# Patient Record
Sex: MALE | Race: WHITE | NOT HISPANIC OR LATINO | Employment: FULL TIME | RURAL
[De-identification: names, ages, dates, MRNs, and addresses within clinical notes are randomized per-mention and may not be internally consistent; named-entity substitution may affect disease eponyms.]

---

## 2021-08-24 ENCOUNTER — OFFICE VISIT (OUTPATIENT)
Dept: FAMILY MEDICINE | Facility: CLINIC | Age: 46
End: 2021-08-24
Payer: COMMERCIAL

## 2021-08-24 VITALS
TEMPERATURE: 98 F | HEIGHT: 71 IN | WEIGHT: 172 LBS | HEART RATE: 75 BPM | OXYGEN SATURATION: 95 % | BODY MASS INDEX: 24.08 KG/M2

## 2021-08-24 DIAGNOSIS — Z20.822 CONTACT WITH AND (SUSPECTED) EXPOSURE TO COVID-19: Primary | ICD-10-CM

## 2021-08-24 LAB
CTP QC/QA: YES
SARS-COV-2 AG RESP QL IA.RAPID: NEGATIVE

## 2021-08-24 PROCEDURE — 99202 PR OFFICE/OUTPT VISIT, NEW, LEVL II, 15-29 MIN: ICD-10-PCS | Mod: ,,, | Performed by: NURSE PRACTITIONER

## 2021-08-24 PROCEDURE — 3008F PR BODY MASS INDEX (BMI) DOCUMENTED: ICD-10-PCS | Mod: ,,, | Performed by: NURSE PRACTITIONER

## 2021-08-24 PROCEDURE — 87426 SARS CORONAVIRUS 2 ANTIGEN POCT: ICD-10-PCS | Mod: QW,,, | Performed by: NURSE PRACTITIONER

## 2021-08-24 PROCEDURE — 1160F RVW MEDS BY RX/DR IN RCRD: CPT | Mod: ,,, | Performed by: NURSE PRACTITIONER

## 2021-08-24 PROCEDURE — 1160F PR REVIEW ALL MEDS BY PRESCRIBER/CLIN PHARMACIST DOCUMENTED: ICD-10-PCS | Mod: ,,, | Performed by: NURSE PRACTITIONER

## 2021-08-24 PROCEDURE — 87426 SARSCOV CORONAVIRUS AG IA: CPT | Mod: QW,,, | Performed by: NURSE PRACTITIONER

## 2021-08-24 PROCEDURE — 99202 OFFICE O/P NEW SF 15 MIN: CPT | Mod: ,,, | Performed by: NURSE PRACTITIONER

## 2021-08-24 PROCEDURE — 1159F PR MEDICATION LIST DOCUMENTED IN MEDICAL RECORD: ICD-10-PCS | Mod: ,,, | Performed by: NURSE PRACTITIONER

## 2021-08-24 PROCEDURE — 3008F BODY MASS INDEX DOCD: CPT | Mod: ,,, | Performed by: NURSE PRACTITIONER

## 2021-08-24 PROCEDURE — 1159F MED LIST DOCD IN RCRD: CPT | Mod: ,,, | Performed by: NURSE PRACTITIONER

## 2021-08-24 RX ORDER — ONDANSETRON HYDROCHLORIDE 8 MG/1
TABLET, FILM COATED ORAL
COMMUNITY
Start: 2021-07-20

## 2021-08-24 RX ORDER — BUPROPION HYDROCHLORIDE 150 MG/1
150 TABLET ORAL EVERY MORNING
COMMUNITY
Start: 2021-08-17

## 2021-08-24 RX ORDER — TAMSULOSIN HYDROCHLORIDE 0.4 MG/1
1 CAPSULE ORAL DAILY
COMMUNITY
Start: 2021-07-21

## 2021-08-24 RX ORDER — HYDROCODONE BITARTRATE AND ACETAMINOPHEN 10; 325 MG/1; MG/1
TABLET ORAL
COMMUNITY
Start: 2021-07-20

## 2023-09-06 ENCOUNTER — OFFICE VISIT (OUTPATIENT)
Dept: FAMILY MEDICINE | Facility: CLINIC | Age: 48
End: 2023-09-06
Payer: COMMERCIAL

## 2023-09-06 VITALS
WEIGHT: 180 LBS | SYSTOLIC BLOOD PRESSURE: 162 MMHG | DIASTOLIC BLOOD PRESSURE: 98 MMHG | HEIGHT: 70 IN | OXYGEN SATURATION: 96 % | TEMPERATURE: 98 F | BODY MASS INDEX: 25.77 KG/M2 | RESPIRATION RATE: 18 BRPM | HEART RATE: 81 BPM

## 2023-09-06 DIAGNOSIS — R03.0 ELEVATED BLOOD PRESSURE READING IN OFFICE WITHOUT DIAGNOSIS OF HYPERTENSION: ICD-10-CM

## 2023-09-06 DIAGNOSIS — J06.9 UPPER RESPIRATORY TRACT INFECTION, UNSPECIFIED TYPE: Primary | ICD-10-CM

## 2023-09-06 DIAGNOSIS — Z91.89 AT INCREASED RISK OF EXPOSURE TO COVID-19 VIRUS: ICD-10-CM

## 2023-09-06 LAB
CTP QC/QA: YES
FLUAV AG NPH QL: NEGATIVE
FLUBV AG NPH QL: NEGATIVE
SARS-COV-2 AG RESP QL IA.RAPID: NEGATIVE

## 2023-09-06 PROCEDURE — 3080F DIAST BP >= 90 MM HG: CPT | Mod: ,,, | Performed by: STUDENT IN AN ORGANIZED HEALTH CARE EDUCATION/TRAINING PROGRAM

## 2023-09-06 PROCEDURE — 3080F PR MOST RECENT DIASTOLIC BLOOD PRESSURE >= 90 MM HG: ICD-10-PCS | Mod: ,,, | Performed by: STUDENT IN AN ORGANIZED HEALTH CARE EDUCATION/TRAINING PROGRAM

## 2023-09-06 PROCEDURE — 87428 SARSCOV & INF VIR A&B AG IA: CPT | Mod: QW,,, | Performed by: STUDENT IN AN ORGANIZED HEALTH CARE EDUCATION/TRAINING PROGRAM

## 2023-09-06 PROCEDURE — 1159F PR MEDICATION LIST DOCUMENTED IN MEDICAL RECORD: ICD-10-PCS | Mod: ,,, | Performed by: STUDENT IN AN ORGANIZED HEALTH CARE EDUCATION/TRAINING PROGRAM

## 2023-09-06 PROCEDURE — 3077F PR MOST RECENT SYSTOLIC BLOOD PRESSURE >= 140 MM HG: ICD-10-PCS | Mod: ,,, | Performed by: STUDENT IN AN ORGANIZED HEALTH CARE EDUCATION/TRAINING PROGRAM

## 2023-09-06 PROCEDURE — 99214 PR OFFICE/OUTPT VISIT, EST, LEVL IV, 30-39 MIN: ICD-10-PCS | Mod: ,,, | Performed by: STUDENT IN AN ORGANIZED HEALTH CARE EDUCATION/TRAINING PROGRAM

## 2023-09-06 PROCEDURE — 99214 OFFICE O/P EST MOD 30 MIN: CPT | Mod: ,,, | Performed by: STUDENT IN AN ORGANIZED HEALTH CARE EDUCATION/TRAINING PROGRAM

## 2023-09-06 PROCEDURE — 3077F SYST BP >= 140 MM HG: CPT | Mod: ,,, | Performed by: STUDENT IN AN ORGANIZED HEALTH CARE EDUCATION/TRAINING PROGRAM

## 2023-09-06 PROCEDURE — 1159F MED LIST DOCD IN RCRD: CPT | Mod: ,,, | Performed by: STUDENT IN AN ORGANIZED HEALTH CARE EDUCATION/TRAINING PROGRAM

## 2023-09-06 PROCEDURE — 3008F BODY MASS INDEX DOCD: CPT | Mod: ,,, | Performed by: STUDENT IN AN ORGANIZED HEALTH CARE EDUCATION/TRAINING PROGRAM

## 2023-09-06 PROCEDURE — 87428 POCT SARS-COV2 (COVID) WITH FLU ANTIGEN: ICD-10-PCS | Mod: QW,,, | Performed by: STUDENT IN AN ORGANIZED HEALTH CARE EDUCATION/TRAINING PROGRAM

## 2023-09-06 PROCEDURE — 3008F PR BODY MASS INDEX (BMI) DOCUMENTED: ICD-10-PCS | Mod: ,,, | Performed by: STUDENT IN AN ORGANIZED HEALTH CARE EDUCATION/TRAINING PROGRAM

## 2023-09-06 RX ORDER — IPRATROPIUM BROMIDE 21 UG/1
2 SPRAY, METERED NASAL 2 TIMES DAILY
Qty: 30 ML | Refills: 0 | Status: SHIPPED | OUTPATIENT
Start: 2023-09-06

## 2023-09-06 RX ORDER — AZITHROMYCIN 250 MG/1
TABLET, FILM COATED ORAL
Qty: 6 TABLET | Refills: 0 | Status: SHIPPED | OUTPATIENT
Start: 2023-09-06

## 2023-09-06 NOTE — LETTER
September 6, 2023      [facility] [address] [facility] [address] [address]  Phone: [phone]  Fax: [phone]       Patient: Austin Miguel   YOB: 1975  Date of Visit: 09/06/2023    To Whom It May Concern:    Birdie Miguel  was at [facility] on 09/06/2023. The patient may return to work/school on 09/07/2023 with no restrictions. If you have any questions or concerns, or if I can be of further assistance, please do not hesitate to contact me.    Sincerely,    Agueda Mercado, CMA

## 2023-09-06 NOTE — PATIENT INSTRUCTIONS
Definition of normal and high blood pressure  Level  Top number  Bottom number    High 130 or above 80 or above   Elevated 120 to 129 79 or below   Normal 119 or below 79 or below       Monitor your blood pressure at home and keep a log, take readings to discuss with your pcp.  Follow a dash diet   Follow up with pcp in 2-4 weeks to eval elevated blood pressure.       May start antibiotics if you do not feel better over the next 4 days.   Coricidin OTC for cold symptoms    Adequate rest and hydration, Tylenol or motrin as needed for pain or fever

## 2023-09-06 NOTE — PROGRESS NOTES
[facility]    Chief Complaint      Chief Complaint   Patient presents with    Cough     Productive (thick brown color)    Eye Pain     Pressure on right eye    No Taste    Nasal Congestion    Documentation Only     Symps started 3 days ago.        History of Present Illness      Austin Miguel is a 48 y.o. male with no chronic conditions  who presents today for upper resp/sinus symptoms.  Has been taking otc cold medications.     Cough  Associated symptoms include headaches. Pertinent negatives include no chest pain, eye redness, sore throat, shortness of breath or wheezing.   Eye Pain   Pertinent negatives include no eye discharge, eye redness, itching or photophobia.       Past Medical History:  History reviewed. No pertinent past medical history.    Past Surgical History:   has no past surgical history on file.    Social History:  Social History     Tobacco Use    Smoking status: Some Days     Types: Cigars    Smokeless tobacco: Never   Substance Use Topics    Alcohol use: Not Currently    Drug use: Not Currently       I personally reviewed all past medical, surgical, and social.     Review of Systems   HENT:  Positive for nasal congestion and sinus pressure/congestion. Negative for sore throat.    Eyes:  Positive for pain (sinus pressure). Negative for photophobia, discharge, redness, itching, visual disturbance and eye dryness.   Respiratory:  Positive for cough (thick brown). Negative for shortness of breath and wheezing.    Cardiovascular:  Negative for chest pain.   Neurological:  Positive for headaches.        Medications:  Outpatient Encounter Medications as of 9/6/2023   Medication Sig Dispense Refill    azithromycin (Z-MELVIN) 250 MG tablet Take 2 tablets by mouth on day 1; Take 1 tablet by mouth on days 2-5 6 tablet 0    buPROPion (WELLBUTRIN XL) 150 MG TB24 tablet Take 150 mg by mouth every morning.      HYDROcodone-acetaminophen (NORCO)  mg per tablet TAKE ONE TABLET FOUR TIMES  "DAILY AS NEEDED FOR PAIN      ipratropium (ATROVENT) 21 mcg (0.03 %) nasal spray 2 sprays by Each Nostril route 2 (two) times daily. 30 mL 0    ondansetron (ZOFRAN) 8 MG tablet TAKE ONE TABLET FOUR TIMES DAILY AS NEEDED FOR NAUSEA AND VOMITING      tamsulosin (FLOMAX) 0.4 mg Cap Take 1 capsule by mouth once daily.       No facility-administered encounter medications on file as of 9/6/2023.       Allergies:  Review of patient's allergies indicates:  No Known Allergies    Health Maintenance:  Immunization History   Administered Date(s) Administered    COVID-19, MRNA, LN-S, PF (Pfizer) (Purple Cap) 09/14/2021, 10/07/2021      Health Maintenance   Topic Date Due    Hepatitis C Screening  Never done    Lipid Panel  Never done    TETANUS VACCINE  Never done    Colorectal Cancer Screening  Never done        Physical Exam      Vital Signs  Temp: 97.8 °F (36.6 °C)  Temp Source: Oral  Pulse: 81  Resp: 18  SpO2: 96 %  BP: (!) 162/98  BP Location: Left arm  Patient Position: Sitting  Height and Weight  Height: 5' 10" (177.8 cm)  Weight: 81.6 kg (180 lb)  BSA (Calculated - sq m): 2.01 sq meters  BMI (Calculated): 25.8  Weight in (lb) to have BMI = 25: 173.9]    Physical Exam  Constitutional:       General: He is not in acute distress.     Appearance: Normal appearance. He is not ill-appearing.   HENT:      Nose:      Right Turbinates: Enlarged.      Mouth/Throat:      Pharynx: No oropharyngeal exudate or posterior oropharyngeal erythema.   Eyes:      General:         Right eye: No discharge.         Left eye: No discharge.      Extraocular Movements: Extraocular movements intact.      Conjunctiva/sclera: Conjunctivae normal.   Cardiovascular:      Rate and Rhythm: Normal rate and regular rhythm.   Pulmonary:      Effort: Pulmonary effort is normal.      Breath sounds: Normal breath sounds.   Skin:     General: Skin is warm and dry.   Neurological:      Mental Status: He is alert.          Laboratory:  CBC:      CMP:    " "    Invalid input(s): "CREATININ"  LIPIDS:      TSH:      A1C:        Assessment/Plan     Austin Miguel is a 48 y.o.male with:    1. Upper Resp Infection,  unspecified   -     ipratropium (ATROVENT) 21 mcg (0.03 %) nasal spray; 2 sprays by Each Nostril route 2 (two) times daily.  Dispense: 30 mL; Refill: 0  -     azithromycin (Z-MELVIN) 250 MG tablet; Take 2 tablets by mouth on day 1; Take 1 tablet by mouth on days 2-5  Dispense: 6 tablet; Refill: 0  - watchful waiting before starting antibiotics     2. At increased risk of exposure to COVID-19 virus  -     POCT SARS-COV2 (COVID) with Flu Antigen    3. Elevated blood pressure reading in office without diagnosis of hypertension    -- dash diet     --  monitor blood pressure and keep a log    -- follow up with pcp in 2-3 weeks              Chronic conditions status updated as per HPI.  Other than changes above, cont current medications and maintain follow up with specialists.  Return to clinic in 2-4 weeks htn.    Patient Instructions   Definition of normal and high blood pressure  Level  Top number  Bottom number    High 130 or above 80 or above   Elevated 120 to 129 79 or below   Normal 119 or below 79 or below       Monitor your blood pressure at home and keep a log, take readings to discuss with your pcp.  Follow a dash diet   Follow up with pcp in 2-4 weeks to eval elevated blood pressure.       May start antibiotics if you do not feel better over the next 4 days.   Coricidin OTC for cold symptoms    Adequate rest and hydration, Tylenol or motrin as needed for pain or fever      Neeta Roe, P-BC  [facility]                  "